# Patient Record
Sex: FEMALE | Race: WHITE | NOT HISPANIC OR LATINO | URBAN - NONMETROPOLITAN AREA
[De-identification: names, ages, dates, MRNs, and addresses within clinical notes are randomized per-mention and may not be internally consistent; named-entity substitution may affect disease eponyms.]

---

## 2021-05-26 ENCOUNTER — IMPORTED ENCOUNTER (OUTPATIENT)
Dept: URBAN - NONMETROPOLITAN AREA CLINIC 1 | Facility: CLINIC | Age: 76
End: 2021-05-26

## 2021-05-26 PROBLEM — H52.4: Noted: 2021-05-26

## 2021-05-26 PROBLEM — H25.813: Noted: 2021-05-26

## 2021-05-26 PROBLEM — H43.12: Noted: 2021-05-26

## 2021-05-26 PROBLEM — E11.8: Noted: 2021-05-26

## 2021-05-26 PROBLEM — H52.223: Noted: 2021-05-26

## 2021-05-26 PROCEDURE — 99214 OFFICE O/P EST MOD 30 MIN: CPT

## 2021-05-26 PROCEDURE — 92134 CPTRZ OPH DX IMG PST SGM RTA: CPT

## 2021-05-26 NOTE — PATIENT DISCUSSION
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

## 2021-10-11 ENCOUNTER — IMPORTED ENCOUNTER (OUTPATIENT)
Dept: URBAN - NONMETROPOLITAN AREA CLINIC 1 | Facility: CLINIC | Age: 76
End: 2021-10-11

## 2021-10-11 PROCEDURE — 92015 DETERMINE REFRACTIVE STATE: CPT

## 2021-10-11 PROCEDURE — 92014 COMPRE OPH EXAM EST PT 1/>: CPT

## 2021-10-11 NOTE — PATIENT DISCUSSION
Astigmatism/Presbyopia-  Discussed refractive status w/ pt today-  MR done new GLRx given-  Monitor yearly or PRN Type II DM-  Discussed findings w/ pt today-  Hemorrhage OS as noted below followed by Dr. Franck Downs-  Stressed importance of good blood sugar control and how it can affect the vision/eye health-  Monitor closelyVitreous Heme OS-  discussed findings w/patient-  patient has been followed closely by Dr. Franck Downs for treatment-  continue per Dr. Mcdaniel Late instructionsCombined Cataracts- Discussed diagnosis in detail with patient- Discussed signs and symptoms of progression- Discussed UV protection- Continue to monitorDermatochalasis / Ptosis - Discussed diagnosis in detail with patient- No superior field of vision loss/complaint noted at this time- Continue to monitor; 's Notes: MR  10/11/2021DFE  10/11/2021OCT Mac 5/26/2021

## 2021-10-12 PROBLEM — E11.8: Noted: 2021-10-12

## 2021-10-12 PROBLEM — H25.813: Noted: 2021-10-12

## 2021-10-12 PROBLEM — H52.4: Noted: 2021-10-12

## 2021-10-12 PROBLEM — H02.413: Noted: 2021-10-12

## 2021-10-12 PROBLEM — H52.223: Noted: 2021-10-12

## 2021-10-12 PROBLEM — H43.12: Noted: 2021-10-12

## 2022-04-09 ASSESSMENT — TONOMETRY
OS_IOP_MMHG: 17
OS_IOP_MMHG: 18
OD_IOP_MMHG: 16
OD_IOP_MMHG: 20

## 2022-04-09 ASSESSMENT — VISUAL ACUITY
OS_PH: 20/400
OD_CC: 20/70
OS_SC: 20/30+2
OD_GLARE: 20/40
OD_PH: 20/30-2
OS_CC: 20/400
OD_CC: 20/30-

## 2022-10-24 ENCOUNTER — COMPREHENSIVE EXAM (OUTPATIENT)
Dept: URBAN - NONMETROPOLITAN AREA CLINIC 4 | Facility: CLINIC | Age: 77
End: 2022-10-24

## 2022-10-24 DIAGNOSIS — H52.4: ICD-10-CM

## 2022-10-24 PROCEDURE — 92014 COMPRE OPH EXAM EST PT 1/>: CPT

## 2022-10-24 PROCEDURE — 92015 DETERMINE REFRACTIVE STATE: CPT

## 2022-10-24 ASSESSMENT — VISUAL ACUITY
OD_SC: 20/30-1
OS_SC: 20/400
OD_BAT: 20/40-2
OD_CC: 20/25-2

## 2022-10-24 ASSESSMENT — TONOMETRY
OD_IOP_MMHG: 16
OS_IOP_MMHG: 15

## 2024-03-20 ENCOUNTER — COMPREHENSIVE EXAM (OUTPATIENT)
Dept: RURAL CLINIC 1 | Facility: CLINIC | Age: 79
End: 2024-03-20

## 2024-03-20 DIAGNOSIS — H43.813: ICD-10-CM

## 2024-03-20 DIAGNOSIS — H25.813: ICD-10-CM

## 2024-03-20 DIAGNOSIS — E11.9: ICD-10-CM

## 2024-03-20 DIAGNOSIS — H02.413: ICD-10-CM

## 2024-03-20 PROCEDURE — 92014 COMPRE OPH EXAM EST PT 1/>: CPT

## 2024-03-20 ASSESSMENT — VISUAL ACUITY
OD_SC: 20/30-2
OS_SC: CF 1FT
OD_SC: J1-2

## 2024-03-20 ASSESSMENT — TONOMETRY
OD_IOP_MMHG: 17
OS_IOP_MMHG: 17

## 2025-03-25 ENCOUNTER — COMPREHENSIVE EXAM (OUTPATIENT)
Age: 80
End: 2025-03-25

## 2025-03-25 DIAGNOSIS — H02.413: ICD-10-CM

## 2025-03-25 DIAGNOSIS — H34.12: ICD-10-CM

## 2025-03-25 DIAGNOSIS — H25.813: ICD-10-CM

## 2025-03-25 DIAGNOSIS — H52.4: ICD-10-CM

## 2025-03-25 DIAGNOSIS — E11.9: ICD-10-CM

## 2025-03-25 DIAGNOSIS — Z98.890: ICD-10-CM

## 2025-03-25 DIAGNOSIS — H43.813: ICD-10-CM

## 2025-03-25 PROCEDURE — 92015 DETERMINE REFRACTIVE STATE: CPT

## 2025-03-25 PROCEDURE — 99214 OFFICE O/P EST MOD 30 MIN: CPT
